# Patient Record
Sex: MALE | Race: WHITE | NOT HISPANIC OR LATINO | URBAN - METROPOLITAN AREA
[De-identification: names, ages, dates, MRNs, and addresses within clinical notes are randomized per-mention and may not be internally consistent; named-entity substitution may affect disease eponyms.]

---

## 2021-08-13 ENCOUNTER — OFFICE VISIT (OUTPATIENT)
Dept: URBAN - METROPOLITAN AREA CLINIC 27 | Facility: CLINIC | Age: 81
End: 2021-08-13

## 2021-08-17 ENCOUNTER — LAB OUTSIDE AN ENCOUNTER (OUTPATIENT)
Dept: URBAN - METROPOLITAN AREA CLINIC 121 | Facility: CLINIC | Age: 81
End: 2021-08-17

## 2021-08-17 LAB
A/G RATIO: (no result)
ALBUMIN: 3.4
ALK PHOS: 97
ANTI SM MUSC: NEGATIVE
ANTI-HAV: REACTIVE
ANTI-HBC: (no result)
ANTIMITOC AB: NEGATIVE
B-12: 476
BG RANDOM: 97
BILI TOTAL: 0.5
BUN/CREAT: (no result)
BUN: 18
CALCIUM: 9.2
CHLORIDE: 102
CO2: 29
CREATININE: 0.88
FERRITIN: 271
FOLATE: 10.3
GLOBULIN TOT: (no result)
HBSAG: (no result)
HEP C AB: (no result)
HGBA1C: (no result)
INR: 1.5
IRON SATUR %: (no result)
IRON: 56
POTASSIUM: 4.9
PROTEIN, TOT: 6.4
PT PATIENT: 15.5
SGOT (AST): 19
SGPT (ALT): 25
TIBC: (no result)
ZZ-GE-UNK: (no result)
ZZ-GE-UNK: 0.05
ZZ-GE-UNK: POSITIVE

## 2021-08-31 ENCOUNTER — OFFICE VISIT (OUTPATIENT)
Dept: URBAN - METROPOLITAN AREA CLINIC 27 | Facility: CLINIC | Age: 81
End: 2021-08-31

## 2021-09-02 ENCOUNTER — LAB OUTSIDE AN ENCOUNTER (OUTPATIENT)
Dept: URBAN - METROPOLITAN AREA CLINIC 121 | Facility: CLINIC | Age: 81
End: 2021-09-02

## 2021-09-02 LAB
A/G RATIO: (no result)
ALBUMIN: 3.8
ALK PHOS: 87
BG RANDOM: 91
BILI TOTAL: 0.7
BUN/CREAT: (no result)
BUN: 17
CALCIUM: 9.2
CHLORIDE: 105
CO2: 18
CREATININE: 1.14
GGT: 10
GLOBULIN TOT: (no result)
HGBA1C: (no result)
POTASSIUM: 4.7
PROTEIN, TOT: 6.6
SGOT (AST): 17
SGPT (ALT): 10
ZZ-GE-UNK: (no result)

## 2021-11-16 ENCOUNTER — LAB OUTSIDE AN ENCOUNTER (OUTPATIENT)
Dept: URBAN - METROPOLITAN AREA CLINIC 121 | Facility: CLINIC | Age: 81
End: 2021-11-16

## 2021-11-16 LAB
A/G RATIO: (no result)
ALBUMIN: 3.8
ALK PHOS: 70
BG RANDOM: 97
BILI TOTAL: 0.5
BUN/CREAT: (no result)
BUN: 22
CALCIUM: 9.1
CHLORIDE: 104
CO2: 26
CREATININE: 1.02
GLOBULIN TOT: (no result)
INR: 1.6
POTASSIUM: 4
PROTEIN, TOT: 6.6
PT PATIENT: 16.4
SGOT (AST): 12
SGPT (ALT): 9
ZZ-GE-UNK: (no result)

## 2021-11-30 ENCOUNTER — OFFICE VISIT (OUTPATIENT)
Dept: URBAN - METROPOLITAN AREA CLINIC 27 | Facility: CLINIC | Age: 81
End: 2021-11-30

## 2021-12-09 ENCOUNTER — LAB OUTSIDE AN ENCOUNTER (OUTPATIENT)
Dept: URBAN - METROPOLITAN AREA CLINIC 121 | Facility: CLINIC | Age: 81
End: 2021-12-09

## 2021-12-09 LAB — ZZ-GE-UNK: (no result)

## 2022-01-20 ENCOUNTER — OFFICE VISIT (OUTPATIENT)
Dept: URBAN - METROPOLITAN AREA SURGERY CENTER 7 | Facility: SURGERY CENTER | Age: 82
End: 2022-01-20

## 2022-03-16 ENCOUNTER — LAB OUTSIDE AN ENCOUNTER (OUTPATIENT)
Dept: URBAN - METROPOLITAN AREA CLINIC 121 | Facility: CLINIC | Age: 82
End: 2022-03-16

## 2022-03-16 LAB
A/G RATIO: (no result)
ALBUMIN: 4.2
ALK PHOS: 91
BILI DIRECT: 0.1
BILI INDIREC: (no result)
BILI TOTAL: 0.4
GLOBULIN TOT: (no result)
PROTEIN, TOT: 6.8
SGOT (AST): 14
SGPT (ALT): 9

## 2022-04-01 ENCOUNTER — OFFICE VISIT (OUTPATIENT)
Dept: URBAN - METROPOLITAN AREA CLINIC 27 | Facility: CLINIC | Age: 82
End: 2022-04-01

## 2022-04-30 ENCOUNTER — TELEPHONE ENCOUNTER (OUTPATIENT)
Dept: URBAN - METROPOLITAN AREA CLINIC 121 | Facility: CLINIC | Age: 82
End: 2022-04-30

## 2022-04-30 RX ORDER — HEPATITIS B VACCINE (RECOMBINANT) 20 UG/ML
INJECT 1 SYRINGE INTRAMUSCULARLY SINGLE DOSE AS DIRECTED INJECT VACCINATION IM AS DIRECTED. TO BE ADMINISTERED VIA PHARMACIST INJECTION, SUSPENSION INTRAMUSCULAR
OUTPATIENT
Start: 2021-09-01 | End: 2021-09-02

## 2022-04-30 RX ORDER — FLUTICASONE PROPIONATE AND SALMETEROL 50; 500 UG/1; UG/1
POWDER RESPIRATORY (INHALATION)
OUTPATIENT
Start: 2015-12-15

## 2022-04-30 RX ORDER — FLUTICASONE PROPIONATE AND SALMETEROL 50; 500 UG/1; UG/1
POWDER RESPIRATORY (INHALATION)
OUTPATIENT
Start: 2015-12-15 | End: 2021-08-13

## 2022-04-30 RX ORDER — AMLODIPINE BESYLATE 10 MG/1
TABLET ORAL
OUTPATIENT
Start: 2015-12-15

## 2022-04-30 RX ORDER — CALCIUM CARBONATE 500 MG/1
TABLET, CHEWABLE ORAL
OUTPATIENT
Start: 2015-12-15 | End: 2021-08-13

## 2022-04-30 RX ORDER — ALBUTEROL SULFATE 0.63 MG/3ML
SOLUTION RESPIRATORY (INHALATION)
OUTPATIENT
Start: 2015-12-15

## 2022-04-30 RX ORDER — FAMOTIDINE 10 MG
TABLET ORAL
OUTPATIENT
Start: 2015-12-15 | End: 2021-08-13

## 2022-04-30 RX ORDER — ALBUTEROL SULFATE 0.63 MG/3ML
SOLUTION RESPIRATORY (INHALATION)
OUTPATIENT
Start: 2015-12-15 | End: 2021-08-13

## 2022-04-30 RX ORDER — CALCIUM CARBONATE 500 MG/1
TABLET, CHEWABLE ORAL
OUTPATIENT
Start: 2015-12-15

## 2022-04-30 RX ORDER — FAMOTIDINE 10 MG
TABLET ORAL
OUTPATIENT
Start: 2015-12-15

## 2022-04-30 RX ORDER — AMLODIPINE BESYLATE 10 MG/1
TABLET ORAL
OUTPATIENT
Start: 2015-12-15 | End: 2021-08-13

## 2022-05-01 ENCOUNTER — TELEPHONE ENCOUNTER (OUTPATIENT)
Dept: URBAN - METROPOLITAN AREA CLINIC 121 | Facility: CLINIC | Age: 82
End: 2022-05-01

## 2022-05-01 RX ORDER — LISINOPRIL 40 MG/1
TABLET ORAL
Status: ACTIVE | COMMUNITY
Start: 2015-12-15

## 2022-05-01 RX ORDER — ATORVASTATIN CALCIUM 10 MG/1
TABLET, FILM COATED ORAL
Status: ACTIVE | COMMUNITY
Start: 2015-12-15

## 2022-05-01 RX ORDER — RIVAROXABAN 20 MG/1
TABLET, FILM COATED ORAL
Status: ACTIVE | COMMUNITY
Start: 2021-08-13

## 2022-05-01 RX ORDER — HYDROCHLOROTHIAZIDE 25 MG/1
TABLET ORAL
Status: ACTIVE | COMMUNITY
Start: 2021-08-13

## 2022-05-01 RX ORDER — NEBIVOLOL HYDROCHLORIDE 10 MG/1
TABLET ORAL
Status: ACTIVE | COMMUNITY
Start: 2021-08-13

## 2022-05-01 RX ORDER — FLUTICASONE FUROATE, UMECLIDINIUM BROMIDE AND VILANTEROL TRIFENATATE 100; 62.5; 25 UG/1; UG/1; UG/1
POWDER RESPIRATORY (INHALATION)
Status: ACTIVE | COMMUNITY
Start: 2021-08-13

## 2022-05-01 RX ORDER — NITROGLYCERIN 0.4 MG/1
TABLET SUBLINGUAL
Status: ACTIVE | COMMUNITY
Start: 2021-08-13

## 2022-05-01 RX ORDER — ALBUTEROL SULFATE 90 UG/1
AEROSOL, METERED RESPIRATORY (INHALATION)
Status: ACTIVE | COMMUNITY
Start: 2021-08-13

## 2022-06-28 ENCOUNTER — LAB OUTSIDE AN ENCOUNTER (OUTPATIENT)
Dept: URBAN - METROPOLITAN AREA CLINIC 27 | Facility: CLINIC | Age: 82
End: 2022-06-28

## 2022-06-29 LAB
ALBUMIN/GLOBULIN RATIO: 1.3
ALBUMIN: 3.9
ALKALINE PHOSPHATASE: 78
ALT (SGPT): 8
AST (SGOT): 12
BILIRUBIN, DIRECT: 0.1
BILIRUBIN, INDIRECT: 0.6
BILIRUBIN, TOTAL: 0.7
GLOBULIN: 2.9
INR: 1.2
PROTEIN, TOTAL: 6.8
PT: 11.9

## 2022-07-06 ENCOUNTER — CLAIMS CREATED FROM THE CLAIM WINDOW (OUTPATIENT)
Dept: URBAN - METROPOLITAN AREA CLINIC 27 | Facility: CLINIC | Age: 82
End: 2022-07-06
Payer: MEDICARE

## 2022-07-06 DIAGNOSIS — K74.00 LIVER FIBROSIS: ICD-10-CM

## 2022-07-06 DIAGNOSIS — I10 ESSENTIAL (PRIMARY) HYPERTENSION: ICD-10-CM

## 2022-07-06 DIAGNOSIS — K63.5 POLYP OF COLON: ICD-10-CM

## 2022-07-06 PROCEDURE — 99214 OFFICE O/P EST MOD 30 MIN: CPT | Performed by: INTERNAL MEDICINE

## 2022-07-06 RX ORDER — LISINOPRIL 40 MG/1
TABLET ORAL
Status: ACTIVE | COMMUNITY
Start: 2015-12-15

## 2022-07-06 RX ORDER — NITROGLYCERIN 0.4 MG/1
TABLET SUBLINGUAL
Status: ACTIVE | COMMUNITY
Start: 2021-08-13

## 2022-07-06 RX ORDER — HYDROCHLOROTHIAZIDE 25 MG/1
TABLET ORAL
Status: ACTIVE | COMMUNITY
Start: 2021-08-13

## 2022-07-06 RX ORDER — RIVAROXABAN 20 MG/1
TABLET, FILM COATED ORAL
Status: ACTIVE | COMMUNITY
Start: 2021-08-13

## 2022-07-06 RX ORDER — FLUTICASONE FUROATE, UMECLIDINIUM BROMIDE AND VILANTEROL TRIFENATATE 100; 62.5; 25 UG/1; UG/1; UG/1
POWDER RESPIRATORY (INHALATION)
Status: ACTIVE | COMMUNITY
Start: 2021-08-13

## 2022-07-06 RX ORDER — ATORVASTATIN CALCIUM 10 MG/1
TABLET, FILM COATED ORAL
Status: ACTIVE | COMMUNITY
Start: 2015-12-15

## 2022-07-06 RX ORDER — ALBUTEROL SULFATE 90 UG/1
AEROSOL, METERED RESPIRATORY (INHALATION)
Status: ACTIVE | COMMUNITY
Start: 2021-08-13

## 2022-07-06 RX ORDER — NEBIVOLOL HYDROCHLORIDE 10 MG/1
TABLET ORAL
Status: ACTIVE | COMMUNITY
Start: 2021-08-13

## 2022-07-06 NOTE — HPI-TODAY'S VISIT:
This is a 82-year-old male seen vs telehealth in follow-up consultation for his liver fibrosis.  Originally had a CT scan with a nodular liver with normal liver enzymes.  Follow-up fibrosis scan showed F2 fibrosis.  His INR was slightly elevated at 1.2 but is also on Xarelto.  Recent liver enzymes from June were again normal.  He feels well without complaints.  He has deferred liver biopsy in the past.  EGD in January showed no varices.  His wife has COVID

## 2022-09-26 ENCOUNTER — TELEPHONE ENCOUNTER (OUTPATIENT)
Dept: URBAN - METROPOLITAN AREA CLINIC 27 | Facility: CLINIC | Age: 82
End: 2022-09-26

## 2022-11-03 ENCOUNTER — LAB OUTSIDE AN ENCOUNTER (OUTPATIENT)
Dept: URBAN - METROPOLITAN AREA CLINIC 27 | Facility: CLINIC | Age: 82
End: 2022-11-03

## 2022-11-04 LAB
ALBUMIN/GLOBULIN RATIO: 1.4
ALBUMIN: 3.9
ALKALINE PHOSPHATASE: 88
ALT (SGPT): 9
AST (SGOT): 12
BILIRUBIN, DIRECT: 0.1
BILIRUBIN, INDIRECT: 0.5
BILIRUBIN, TOTAL: 0.6
GLOBULIN: 2.8
INR: 1.4
PROTEIN, TOTAL: 6.7
PT: 14.3

## 2022-11-09 ENCOUNTER — OFFICE VISIT (OUTPATIENT)
Dept: URBAN - METROPOLITAN AREA CLINIC 27 | Facility: CLINIC | Age: 82
End: 2022-11-09
Payer: MEDICARE

## 2022-11-09 ENCOUNTER — WEB ENCOUNTER (OUTPATIENT)
Dept: URBAN - METROPOLITAN AREA CLINIC 27 | Facility: CLINIC | Age: 82
End: 2022-11-09

## 2022-11-09 VITALS — WEIGHT: 175 LBS | BODY MASS INDEX: 25.92 KG/M2 | HEIGHT: 69 IN

## 2022-11-09 DIAGNOSIS — K74.00 LIVER FIBROSIS: ICD-10-CM

## 2022-11-09 DIAGNOSIS — I10 ESSENTIAL (PRIMARY) HYPERTENSION: ICD-10-CM

## 2022-11-09 DIAGNOSIS — K63.5 POLYP OF COLON: ICD-10-CM

## 2022-11-09 DIAGNOSIS — R19.7 DIARRHEA, UNSPECIFIED: ICD-10-CM

## 2022-11-09 PROCEDURE — 99214 OFFICE O/P EST MOD 30 MIN: CPT | Performed by: INTERNAL MEDICINE

## 2022-11-09 RX ORDER — AMLODIPINE BESYLATE 10 MG/1
1 TABLET TABLET ORAL ONCE A DAY
Status: ACTIVE | COMMUNITY

## 2022-11-09 RX ORDER — ALBUTEROL SULFATE 90 UG/1
AEROSOL, METERED RESPIRATORY (INHALATION)
Status: ACTIVE | COMMUNITY
Start: 2021-08-13

## 2022-11-09 RX ORDER — HYDROCHLOROTHIAZIDE 12.5 MG/1
1 TABLET IN THE MORNING CAPSULE, GELATIN COATED ORAL
Status: ACTIVE | COMMUNITY
Start: 2021-08-13

## 2022-11-09 RX ORDER — LISINOPRIL 40 MG/1
TABLET ORAL
Status: ACTIVE | COMMUNITY
Start: 2015-12-15

## 2022-11-09 RX ORDER — NEBIVOLOL HYDROCHLORIDE 5 MG/1
1 TABLET TABLET ORAL
Status: ACTIVE | COMMUNITY
Start: 2021-08-13

## 2022-11-09 RX ORDER — FLUTICASONE FUROATE, UMECLIDINIUM BROMIDE AND VILANTEROL TRIFENATATE 100; 62.5; 25 UG/1; UG/1; UG/1
POWDER RESPIRATORY (INHALATION)
Status: ACTIVE | COMMUNITY
Start: 2021-08-13

## 2022-11-09 RX ORDER — NITROGLYCERIN 0.4 MG/1
TABLET SUBLINGUAL
Status: ACTIVE | COMMUNITY
Start: 2021-08-13

## 2022-11-09 RX ORDER — ATORVASTATIN CALCIUM 10 MG/1
TABLET, FILM COATED ORAL
Status: DISCONTINUED | COMMUNITY
Start: 2015-12-15

## 2022-11-09 RX ORDER — RIVAROXABAN 20 MG/1
TABLET, FILM COATED ORAL
Status: ACTIVE | COMMUNITY
Start: 2021-08-13

## 2022-11-09 NOTE — HPI-TODAY'S VISIT:
This is an 82-year-old male seen in consultation today for diarrhea.  He has had intermittent diarrhea in the past but since October 23 he has had more continuous diarrhea 3-4 a day.  He has been on and off the brat diet.  No bleeding.  He takes Bystolic lisinopril.  He takes Prilosec for reflux for many years and that is stable.  His liver showed F2 fibrosis.  Colonoscopy 2 years ago.  Weight is stable.  He is concerned about the new onset diarrhea

## 2022-11-10 ENCOUNTER — TELEPHONE ENCOUNTER (OUTPATIENT)
Dept: URBAN - METROPOLITAN AREA CLINIC 27 | Facility: CLINIC | Age: 82
End: 2022-11-10

## 2022-11-10 ENCOUNTER — LAB OUTSIDE AN ENCOUNTER (OUTPATIENT)
Dept: URBAN - METROPOLITAN AREA CLINIC 27 | Facility: CLINIC | Age: 82
End: 2022-11-10

## 2022-11-17 LAB
CALPROTECTIN, FECAL: 1500
CAMPYLOBACTER SPP. AG,EIA: (no result)
CLOSTRIDIUM DIFFICILE: (no result)
OVA AND PARASITES, CONC AND PERM SMEAR: (no result)
SALMONELLA AND SHIGELLA, CULTURE: (no result)
SHIGA TOXINS, EIA W/RFL TO E.COLI O157 CULTURE: (no result)

## 2022-11-18 ENCOUNTER — TELEPHONE ENCOUNTER (OUTPATIENT)
Dept: URBAN - METROPOLITAN AREA CLINIC 27 | Facility: CLINIC | Age: 82
End: 2022-11-18

## 2022-11-18 PROBLEM — 62315008 DIARRHEA: Status: ACTIVE | Noted: 2022-11-18

## 2022-11-18 PROBLEM — 59621000 ESSENTIAL HYPERTENSION: Status: ACTIVE | Noted: 2022-07-06

## 2023-01-09 ENCOUNTER — TELEPHONE ENCOUNTER (OUTPATIENT)
Dept: URBAN - METROPOLITAN AREA CLINIC 27 | Facility: CLINIC | Age: 83
End: 2023-01-09

## 2023-03-14 ENCOUNTER — LAB OUTSIDE AN ENCOUNTER (OUTPATIENT)
Dept: URBAN - METROPOLITAN AREA CLINIC 27 | Facility: CLINIC | Age: 83
End: 2023-03-14

## 2023-03-15 LAB
ALBUMIN/GLOBULIN RATIO: 1.5
ALBUMIN: 4.1
ALKALINE PHOSPHATASE: 79
ALT (SGPT): 10
AST (SGOT): 13
BILIRUBIN, DIRECT: 0.1
BILIRUBIN, INDIRECT: 0.5
BILIRUBIN, TOTAL: 0.6
GLOBULIN: 2.8
PROTEIN, TOTAL: 6.9

## 2023-03-21 ENCOUNTER — OFFICE VISIT (OUTPATIENT)
Dept: URBAN - METROPOLITAN AREA CLINIC 27 | Facility: CLINIC | Age: 83
End: 2023-03-21
Payer: MEDICARE

## 2023-03-21 ENCOUNTER — CLAIMS CREATED FROM THE CLAIM WINDOW (OUTPATIENT)
Dept: URBAN - METROPOLITAN AREA CLINIC 27 | Facility: CLINIC | Age: 83
End: 2023-03-21

## 2023-03-21 VITALS
HEART RATE: 51 BPM | DIASTOLIC BLOOD PRESSURE: 69 MMHG | SYSTOLIC BLOOD PRESSURE: 133 MMHG | BODY MASS INDEX: 25.92 KG/M2 | HEIGHT: 69 IN | WEIGHT: 175 LBS

## 2023-03-21 DIAGNOSIS — K21.9 GERD: ICD-10-CM

## 2023-03-21 DIAGNOSIS — R19.7 DIARRHEA, UNSPECIFIED: ICD-10-CM

## 2023-03-21 DIAGNOSIS — K74.00 LIVER FIBROSIS: ICD-10-CM

## 2023-03-21 DIAGNOSIS — K63.5 POLYP OF COLON: ICD-10-CM

## 2023-03-21 DIAGNOSIS — I10 ESSENTIAL (PRIMARY) HYPERTENSION: ICD-10-CM

## 2023-03-21 PROBLEM — 235595009 GASTROESOPHAGEAL REFLUX DISEASE: Status: ACTIVE | Noted: 2023-03-21

## 2023-03-21 PROCEDURE — 99214 OFFICE O/P EST MOD 30 MIN: CPT | Performed by: INTERNAL MEDICINE

## 2023-03-21 RX ORDER — NEBIVOLOL HYDROCHLORIDE 5 MG/1
1 TABLET TABLET ORAL
Status: ACTIVE | COMMUNITY
Start: 2021-08-13

## 2023-03-21 RX ORDER — ALBUTEROL SULFATE 90 UG/1
AEROSOL, METERED RESPIRATORY (INHALATION)
Status: ACTIVE | COMMUNITY
Start: 2021-08-13

## 2023-03-21 RX ORDER — FLUTICASONE FUROATE, UMECLIDINIUM BROMIDE AND VILANTEROL TRIFENATATE 100; 62.5; 25 UG/1; UG/1; UG/1
POWDER RESPIRATORY (INHALATION)
Status: ACTIVE | COMMUNITY
Start: 2021-08-13

## 2023-03-21 RX ORDER — RIVAROXABAN 20 MG/1
TABLET, FILM COATED ORAL
Status: ACTIVE | COMMUNITY
Start: 2021-08-13

## 2023-03-21 RX ORDER — LISINOPRIL 40 MG/1
TABLET ORAL
Status: ACTIVE | COMMUNITY
Start: 2015-12-15

## 2023-03-21 RX ORDER — NITROGLYCERIN 0.4 MG/1
TABLET SUBLINGUAL
Status: ACTIVE | COMMUNITY
Start: 2021-08-13

## 2023-03-21 RX ORDER — AMLODIPINE BESYLATE 10 MG/1
1 TABLET TABLET ORAL ONCE A DAY
Status: ACTIVE | COMMUNITY

## 2023-03-21 RX ORDER — HYDROCHLOROTHIAZIDE 12.5 MG/1
1 TABLET IN THE MORNING CAPSULE, GELATIN COATED ORAL
Status: ACTIVE | COMMUNITY
Start: 2021-08-13

## 2023-03-21 NOTE — HPI-TODAY'S VISIT:
This is a 83-year-old male seen in follow-up consultation for his abnormal liver test.  Recent blood work reveals normal liver enzymes.  INR is slightly elevated at 1.4 but he is also on Xarelto.  In November 2021 he underwent a FibroSure test which suggested F2 fibrosis.  This was as a result of a CT scan that revealed a nodular liver suggestive of possible cirrhosis.  He has not undergone liver biopsy.  That was at the time he had pancreatitis.  No further symptoms.  He also has reflux on Prevacid but he will get breakthrough symptoms most recently with eating barbecue.  He also has COPD and atrial fibrillation.  He was last seen for some diarrhea.  Stool studies were negative but fecal calprotectin was slightly elevated.  I placed him on Benefiber and his symptoms have completely resolved and he has no issues currently.

## 2023-03-30 ENCOUNTER — TELEPHONE ENCOUNTER (OUTPATIENT)
Dept: URBAN - METROPOLITAN AREA CLINIC 27 | Facility: CLINIC | Age: 83
End: 2023-03-30

## 2023-04-05 ENCOUNTER — TELEPHONE ENCOUNTER (OUTPATIENT)
Dept: URBAN - METROPOLITAN AREA CLINIC 27 | Facility: CLINIC | Age: 83
End: 2023-04-05

## 2023-04-10 ENCOUNTER — TELEPHONE ENCOUNTER (OUTPATIENT)
Dept: URBAN - METROPOLITAN AREA CLINIC 27 | Facility: CLINIC | Age: 83
End: 2023-04-10

## 2023-06-27 ENCOUNTER — LAB OUTSIDE AN ENCOUNTER (OUTPATIENT)
Dept: URBAN - METROPOLITAN AREA CLINIC 27 | Facility: CLINIC | Age: 83
End: 2023-06-27

## 2023-06-28 LAB
ALBUMIN/GLOBULIN RATIO: 1.2
ALBUMIN: 3.7
ALKALINE PHOSPHATASE: 83
ALT (SGPT): 14
AST (SGOT): 14
BILIRUBIN, DIRECT: 0.2
BILIRUBIN, INDIRECT: 0.4
BILIRUBIN, TOTAL: 0.6
GLOBULIN: 3
PROTEIN, TOTAL: 6.7

## 2023-07-04 ENCOUNTER — WEB ENCOUNTER (OUTPATIENT)
Dept: URBAN - METROPOLITAN AREA CLINIC 27 | Facility: CLINIC | Age: 83
End: 2023-07-04

## 2023-07-10 ENCOUNTER — OFFICE VISIT (OUTPATIENT)
Dept: URBAN - METROPOLITAN AREA CLINIC 27 | Facility: CLINIC | Age: 83
End: 2023-07-10
Payer: MEDICARE

## 2023-07-10 VITALS
DIASTOLIC BLOOD PRESSURE: 77 MMHG | HEART RATE: 59 BPM | WEIGHT: 178 LBS | SYSTOLIC BLOOD PRESSURE: 121 MMHG | HEIGHT: 69 IN | BODY MASS INDEX: 26.36 KG/M2

## 2023-07-10 DIAGNOSIS — I10 ESSENTIAL (PRIMARY) HYPERTENSION: ICD-10-CM

## 2023-07-10 DIAGNOSIS — K74.00 LIVER FIBROSIS: ICD-10-CM

## 2023-07-10 DIAGNOSIS — K21.9 GERD: ICD-10-CM

## 2023-07-10 DIAGNOSIS — K63.5 POLYP OF COLON: ICD-10-CM

## 2023-07-10 PROCEDURE — 99214 OFFICE O/P EST MOD 30 MIN: CPT | Performed by: INTERNAL MEDICINE

## 2023-07-10 RX ORDER — NEBIVOLOL HYDROCHLORIDE 5 MG/1
1 TABLET TABLET ORAL
Status: ACTIVE | COMMUNITY
Start: 2021-08-13

## 2023-07-10 RX ORDER — AMLODIPINE BESYLATE 10 MG/1
1 TABLET TABLET ORAL ONCE A DAY
Status: ACTIVE | COMMUNITY

## 2023-07-10 RX ORDER — ALBUTEROL SULFATE 90 UG/1
AEROSOL, METERED RESPIRATORY (INHALATION)
Status: ACTIVE | COMMUNITY
Start: 2021-08-13

## 2023-07-10 RX ORDER — RIVAROXABAN 20 MG/1
TABLET, FILM COATED ORAL
Status: ACTIVE | COMMUNITY
Start: 2021-08-13

## 2023-07-10 RX ORDER — HYDROCHLOROTHIAZIDE 12.5 MG/1
1 TABLET IN THE MORNING CAPSULE, GELATIN COATED ORAL
Status: ACTIVE | COMMUNITY
Start: 2021-08-13

## 2023-07-10 RX ORDER — FLUTICASONE FUROATE, UMECLIDINIUM BROMIDE AND VILANTEROL TRIFENATATE 100; 62.5; 25 UG/1; UG/1; UG/1
POWDER RESPIRATORY (INHALATION)
Status: ACTIVE | COMMUNITY
Start: 2021-08-13

## 2023-07-10 RX ORDER — LISINOPRIL 40 MG/1
TABLET ORAL
Status: ACTIVE | COMMUNITY
Start: 2015-12-15

## 2023-07-10 RX ORDER — NITROGLYCERIN 0.4 MG/1
TABLET SUBLINGUAL
Status: ACTIVE | COMMUNITY
Start: 2021-08-13

## 2023-07-10 NOTE — HPI-TODAY'S VISIT:
This is a 83-year-old male seen in follow-up consultation for his abnormal liver.  Originally had a CT scan that suggested a nodular liver.  Liver enzymes are normal.  FibroScan in the end of last year suggested F2 fibrosis.  His INR is slightly elevated but is also on Xarelto.  Overall he feels well.  He is undergoing cardiac evaluation for an abnormal chest x-ray of the heart.  He is treated for hypertension.  He otherwise feels well and has started playing the [employer] again

## 2023-07-21 ENCOUNTER — TELEPHONE ENCOUNTER (OUTPATIENT)
Dept: URBAN - METROPOLITAN AREA CLINIC 27 | Facility: CLINIC | Age: 83
End: 2023-07-21

## 2023-10-02 ENCOUNTER — TELEPHONE ENCOUNTER (OUTPATIENT)
Dept: URBAN - METROPOLITAN AREA CLINIC 27 | Facility: CLINIC | Age: 83
End: 2023-10-02

## 2023-10-05 ENCOUNTER — LAB OUTSIDE AN ENCOUNTER (OUTPATIENT)
Dept: URBAN - METROPOLITAN AREA CLINIC 27 | Facility: CLINIC | Age: 83
End: 2023-10-05

## 2023-10-05 ENCOUNTER — WEB ENCOUNTER (OUTPATIENT)
Dept: URBAN - METROPOLITAN AREA CLINIC 27 | Facility: CLINIC | Age: 83
End: 2023-10-05

## 2023-10-06 LAB
FERRITIN, SERUM: 69
FOLATE (FOLIC ACID), SERUM: 12
IRON BIND.CAP.(TIBC): 299
IRON SATURATION: 36
IRON: 107
RETICULOCYTE COUNT: 1.6
RETICULOCYTE, ABSOLUTE: (no result)
VITAMIN B12: 208

## 2023-10-11 ENCOUNTER — OFFICE VISIT (OUTPATIENT)
Dept: URBAN - METROPOLITAN AREA CLINIC 27 | Facility: CLINIC | Age: 83
End: 2023-10-11
Payer: MEDICARE

## 2023-10-11 VITALS
DIASTOLIC BLOOD PRESSURE: 67 MMHG | WEIGHT: 186.8 LBS | HEIGHT: 69 IN | SYSTOLIC BLOOD PRESSURE: 117 MMHG | BODY MASS INDEX: 27.67 KG/M2 | HEART RATE: 49 BPM

## 2023-10-11 DIAGNOSIS — K74.00 LIVER FIBROSIS: ICD-10-CM

## 2023-10-11 DIAGNOSIS — K63.5 POLYP OF COLON: ICD-10-CM

## 2023-10-11 DIAGNOSIS — K21.9 GERD: ICD-10-CM

## 2023-10-11 DIAGNOSIS — I10 ESSENTIAL (PRIMARY) HYPERTENSION: ICD-10-CM

## 2023-10-11 PROCEDURE — 99213 OFFICE O/P EST LOW 20 MIN: CPT | Performed by: INTERNAL MEDICINE

## 2023-10-11 RX ORDER — RIVAROXABAN 20 MG/1
TABLET, FILM COATED ORAL
Status: ACTIVE | COMMUNITY
Start: 2021-08-13

## 2023-10-11 RX ORDER — AMLODIPINE BESYLATE 10 MG/1
1 TABLET TABLET ORAL ONCE A DAY
Status: ACTIVE | COMMUNITY

## 2023-10-11 RX ORDER — NEBIVOLOL HYDROCHLORIDE 5 MG/1
1 TABLET TABLET ORAL
Status: ACTIVE | COMMUNITY
Start: 2021-08-13

## 2023-10-11 RX ORDER — NITROGLYCERIN 0.4 MG/1
TABLET SUBLINGUAL
Status: ACTIVE | COMMUNITY
Start: 2021-08-13

## 2023-10-11 RX ORDER — LISINOPRIL 40 MG/1
TABLET ORAL
Status: ACTIVE | COMMUNITY
Start: 2015-12-15

## 2023-10-11 RX ORDER — FLUTICASONE FUROATE, UMECLIDINIUM BROMIDE AND VILANTEROL TRIFENATATE 100; 62.5; 25 UG/1; UG/1; UG/1
POWDER RESPIRATORY (INHALATION)
Status: ACTIVE | COMMUNITY
Start: 2021-08-13

## 2023-10-11 RX ORDER — HYDROCHLOROTHIAZIDE 12.5 MG/1
1 TABLET IN THE MORNING CAPSULE, GELATIN COATED ORAL
Status: ACTIVE | COMMUNITY
Start: 2021-08-13

## 2023-10-11 RX ORDER — ALBUTEROL SULFATE 90 UG/1
AEROSOL, METERED RESPIRATORY (INHALATION)
Status: ACTIVE | COMMUNITY
Start: 2021-08-13

## 2023-10-11 NOTE — HPI-TODAY'S VISIT:
This is a 83-year-old male seen in follow-up consultation for his F2 fibrosis.  This was identified on FibroScan in November 2021.  His liver enzymes have remained normal.  His main complaint is sciatica pain.  He is on Xarelto and has had 2 falls in the last 2 weeks and has a bruise on his left arm.  He is seen by Dr. Echeverria  Recent iron studies were also normal.  Last ultrasound in July showed liver cysts that were stable.  Otherwise is doing okay

## 2024-04-10 ENCOUNTER — OV EP (OUTPATIENT)
Dept: URBAN - METROPOLITAN AREA CLINIC 27 | Facility: CLINIC | Age: 84
End: 2024-04-10
Payer: MEDICARE

## 2024-04-10 VITALS
DIASTOLIC BLOOD PRESSURE: 66 MMHG | HEIGHT: 69 IN | HEART RATE: 49 BPM | SYSTOLIC BLOOD PRESSURE: 123 MMHG | WEIGHT: 195 LBS | RESPIRATION RATE: 17 BRPM | BODY MASS INDEX: 28.88 KG/M2

## 2024-04-10 DIAGNOSIS — K74.00 LIVER FIBROSIS: ICD-10-CM

## 2024-04-10 DIAGNOSIS — I50.9 CONGESTIVE HEART DISEASE: ICD-10-CM

## 2024-04-10 DIAGNOSIS — I10 ESSENTIAL (PRIMARY) HYPERTENSION: ICD-10-CM

## 2024-04-10 PROCEDURE — 99214 OFFICE O/P EST MOD 30 MIN: CPT | Performed by: INTERNAL MEDICINE

## 2024-04-10 RX ORDER — ALBUTEROL SULFATE 90 UG/1
AEROSOL, METERED RESPIRATORY (INHALATION)
Status: ACTIVE | COMMUNITY
Start: 2021-08-13

## 2024-04-10 RX ORDER — RIVAROXABAN 20 MG/1
TABLET, FILM COATED ORAL
Status: ACTIVE | COMMUNITY
Start: 2021-08-13

## 2024-04-10 RX ORDER — AMLODIPINE BESYLATE 10 MG/1
1 TABLET TABLET ORAL ONCE A DAY
Status: ACTIVE | COMMUNITY

## 2024-04-10 RX ORDER — NITROGLYCERIN 0.4 MG/1
TABLET SUBLINGUAL
Status: ACTIVE | COMMUNITY
Start: 2021-08-13

## 2024-04-10 RX ORDER — FLUTICASONE FUROATE, UMECLIDINIUM BROMIDE AND VILANTEROL TRIFENATATE 100; 62.5; 25 UG/1; UG/1; UG/1
POWDER RESPIRATORY (INHALATION)
Status: ACTIVE | COMMUNITY
Start: 2021-08-13

## 2024-04-10 RX ORDER — LISINOPRIL 40 MG/1
TABLET ORAL
Status: ACTIVE | COMMUNITY
Start: 2015-12-15

## 2024-04-10 RX ORDER — HYDROCHLOROTHIAZIDE 12.5 MG/1
1 TABLET IN THE MORNING CAPSULE, GELATIN COATED ORAL
Status: ACTIVE | COMMUNITY
Start: 2021-08-13

## 2024-04-10 RX ORDER — NEBIVOLOL HYDROCHLORIDE 5 MG/1
1 TABLET TABLET ORAL
Status: ACTIVE | COMMUNITY
Start: 2021-08-13

## 2024-04-10 NOTE — HPI-TODAY'S VISIT:
This is a 84-year-old male seen in follow-up consultation for his F2 fibrosis.  His liver enzymes have been normal.  He had an ultrasound last year that showed stable liver cysts.  He comes in today stating that he has changed cardiologists and last week was diagnosed with CHF.  He had been following Dr. Robledo for shortness of breath he has been started on Lasix and is now urinating and has lost weight.  He has an echo Friday and follow-up with Dr. Robledo next month.  His leg swelling has improved.  From a GI standpoint he is having no issues.  He states he had vaccine for hepatitis B a few years ago.  Otherwise no complaints FibroScan in 2021

## 2024-04-11 LAB
A/G RATIO: 1.4
ALBUMIN: 4.2
ALKALINE PHOSPHATASE: 95
ALT (SGPT): 11
AST (SGOT): 14
BILIRUBIN, TOTAL: 1
BUN/CREATININE RATIO: (no result)
BUN: 18
CALCIUM: 9.5
CARBON DIOXIDE, TOTAL: 27
CHLORIDE: 101
CREATININE: 0.99
EGFR: 75
GLOBULIN, TOTAL: 2.9
GLUCOSE: 89
HEPATITIS A AB, TOTAL: REACTIVE
HEPATITIS B CORE AB TOTAL: (no result)
HEPATITIS B SURFACE ANTIBODY QL: (no result)
POTASSIUM: 4.7
PROTEIN, TOTAL: 7.1
SODIUM: 142

## 2024-07-22 ENCOUNTER — WEB ENCOUNTER (OUTPATIENT)
Dept: URBAN - METROPOLITAN AREA CLINIC 27 | Facility: CLINIC | Age: 84
End: 2024-07-22

## 2024-07-31 ENCOUNTER — TELEPHONE ENCOUNTER (OUTPATIENT)
Dept: URBAN - METROPOLITAN AREA CLINIC 27 | Facility: CLINIC | Age: 84
End: 2024-07-31

## 2024-08-19 ENCOUNTER — OFFICE VISIT (OUTPATIENT)
Dept: URBAN - METROPOLITAN AREA CLINIC 27 | Facility: CLINIC | Age: 84
End: 2024-08-19

## 2024-09-20 ENCOUNTER — OFFICE VISIT (OUTPATIENT)
Dept: URBAN - METROPOLITAN AREA CLINIC 27 | Facility: CLINIC | Age: 84
End: 2024-09-20
Payer: MEDICARE

## 2024-09-20 ENCOUNTER — DASHBOARD ENCOUNTERS (OUTPATIENT)
Age: 84
End: 2024-09-20

## 2024-09-20 VITALS
DIASTOLIC BLOOD PRESSURE: 59 MMHG | HEIGHT: 69 IN | BODY MASS INDEX: 26.81 KG/M2 | WEIGHT: 181 LBS | HEART RATE: 65 BPM | SYSTOLIC BLOOD PRESSURE: 98 MMHG

## 2024-09-20 DIAGNOSIS — K21.9 GERD: ICD-10-CM

## 2024-09-20 DIAGNOSIS — K63.5 POLYP OF COLON: ICD-10-CM

## 2024-09-20 DIAGNOSIS — I10 ESSENTIAL (PRIMARY) HYPERTENSION: ICD-10-CM

## 2024-09-20 PROCEDURE — 99213 OFFICE O/P EST LOW 20 MIN: CPT | Performed by: INTERNAL MEDICINE

## 2024-09-20 RX ORDER — ALBUTEROL SULFATE 90 UG/1
AEROSOL, METERED RESPIRATORY (INHALATION)
Status: ACTIVE | COMMUNITY
Start: 2021-08-13

## 2024-09-20 RX ORDER — FLUTICASONE FUROATE, UMECLIDINIUM BROMIDE AND VILANTEROL TRIFENATATE 100; 62.5; 25 UG/1; UG/1; UG/1
POWDER RESPIRATORY (INHALATION)
Status: ACTIVE | COMMUNITY
Start: 2021-08-13

## 2024-09-20 RX ORDER — NITROGLYCERIN 0.4 MG/1
TABLET SUBLINGUAL
Status: ACTIVE | COMMUNITY
Start: 2021-08-13

## 2024-09-20 RX ORDER — HYDROCHLOROTHIAZIDE 12.5 MG/1
1 TABLET IN THE MORNING CAPSULE, GELATIN COATED ORAL
Status: ACTIVE | COMMUNITY
Start: 2021-08-13

## 2024-09-20 RX ORDER — AMLODIPINE BESYLATE 10 MG/1
1 TABLET TABLET ORAL ONCE A DAY
Status: ACTIVE | COMMUNITY

## 2024-09-20 RX ORDER — LISINOPRIL 40 MG/1
TABLET ORAL
Status: ACTIVE | COMMUNITY
Start: 2015-12-15

## 2024-09-20 RX ORDER — NEBIVOLOL HYDROCHLORIDE 5 MG/1
1 TABLET TABLET ORAL
Status: ACTIVE | COMMUNITY
Start: 2021-08-13

## 2024-09-20 RX ORDER — RIVAROXABAN 20 MG/1
TABLET, FILM COATED ORAL
Status: ACTIVE | COMMUNITY
Start: 2021-08-13

## 2024-09-20 NOTE — HPI-TODAY'S VISIT:
This is an 84-year-old male seen in consultation today for reflux.  The symptoms are new.  Tums will resolve it 1-2 times a week.  He has had some reflux symptoms for many years.  He is treated for hypertension.  He takes Prilosec for his reflux and generally that will control symptoms.  Certain foods like onions are exacerbating the symptoms recently. In terms of the liver he had a CT scan a few years ago that showed a nodular liver.  FibroSure in the past suggested fibrosis.  Recent FibroScan was unrevealing.  He has a h/o CHF